# Patient Record
Sex: FEMALE | Race: WHITE | URBAN - METROPOLITAN AREA
[De-identification: names, ages, dates, MRNs, and addresses within clinical notes are randomized per-mention and may not be internally consistent; named-entity substitution may affect disease eponyms.]

---

## 2021-05-14 ENCOUNTER — OFFICE VISIT (OUTPATIENT)
Dept: URBAN - METROPOLITAN AREA CLINIC 91 | Facility: CLINIC | Age: 86
End: 2021-05-14

## 2021-05-14 DIAGNOSIS — H04.123 DRY EYE SYNDROME OF BILATERAL LACRIMAL GLANDS: ICD-10-CM

## 2021-05-14 DIAGNOSIS — H02.831 DERMATOCHALASIS OF RIGHT UPPER EYELID: ICD-10-CM

## 2021-05-14 DIAGNOSIS — H35.3132 BILATERAL NONEXUDATIVE AGE-RELATED MACULAR DEGENERATION, INTERMEDIATE DRY STAGE: Primary | ICD-10-CM

## 2021-05-14 DIAGNOSIS — H25.093 OTHER AGE-RELATED INCIPIENT CATARACT, BILATERAL: ICD-10-CM

## 2021-05-14 DIAGNOSIS — H02.834 DERMATOCHALASIS OF LEFT UPPER EYELID: ICD-10-CM

## 2021-05-14 PROCEDURE — 99213 OFFICE O/P EST LOW 20 MIN: CPT | Performed by: OPHTHALMOLOGY

## 2021-05-14 ASSESSMENT — INTRAOCULAR PRESSURE
OD: 13
OS: 13

## 2021-05-14 ASSESSMENT — VISUAL ACUITY: OS: 20/40

## 2021-05-14 NOTE — IMPRESSION/PLAN
Impression: Other age-related incipient cataract, bilateral: H25.093. Plan: Discussed posterior capsular opacification. Due to the fact that PCO is advanced and affecting the patient's vision and quality of life, YAG capsulotomy will be scheduled. All risks, benefits and alternatives were explained in detail including: possibility of retinal detachment, dislocated IOL, loss of vision or eye and possible need for further surgery.

## 2021-05-14 NOTE — IMPRESSION/PLAN
Impression: Bilateral nonexudative age-related macular degeneration, intermediate dry stage: H35.3132. Plan: For macular degeneration, I recommended using an Amsler grid daily to check vision, and to call office immediately if change is noted. AREDS vitamins were also recommended. Importance of compliance and regular follow-up appointments was emphasized. For follow-up and possible progression of macular degeneration, I have recommended an OCTm to be performed.

## 2021-05-21 ENCOUNTER — PROCEDURE (OUTPATIENT)
Dept: URBAN - METROPOLITAN AREA CLINIC 91 | Facility: CLINIC | Age: 86
End: 2021-05-21

## 2021-05-21 DIAGNOSIS — H26.492 OTHER SECONDARY CATARACT, LEFT EYE: Primary | ICD-10-CM

## 2021-05-21 PROCEDURE — 66821 AFTER CATARACT LASER SURGERY: CPT | Performed by: OPHTHALMOLOGY

## 2021-06-11 ENCOUNTER — POST-OPERATIVE VISIT (OUTPATIENT)
Dept: URBAN - METROPOLITAN AREA CLINIC 91 | Facility: CLINIC | Age: 86
End: 2021-06-11

## 2021-06-11 DIAGNOSIS — H52.4 PRESBYOPIA: ICD-10-CM

## 2021-06-11 DIAGNOSIS — Z48.810 ENCOUNTER FOR SURGICAL AFTERCARE FOLLOWING SURGERY ON A SENSE ORGAN: Primary | ICD-10-CM

## 2021-06-11 PROCEDURE — 99024 POSTOP FOLLOW-UP VISIT: CPT | Performed by: OPHTHALMOLOGY

## 2021-06-11 PROCEDURE — 92015 DETERMINE REFRACTIVE STATE: CPT | Performed by: OPHTHALMOLOGY

## 2021-06-11 ASSESSMENT — VISUAL ACUITY
OS: 20/40
OD: CF 3FT

## 2021-06-11 ASSESSMENT — INTRAOCULAR PRESSURE
OD: 14
OS: 13

## 2021-06-11 NOTE — IMPRESSION/PLAN
Impression:  Encounter for surgical aftercare following surgery on a sense organ  Z48.810. Excellent post op course   Post operative instructions reviewed - Condition is improving - Plan: Patient healing well post YAG OS, MRX reviewed and dispensed. Discussed that due to macular degeneration there may be difficulty but they will still help improve her vision.

## 2021-10-29 ENCOUNTER — OFFICE VISIT (OUTPATIENT)
Dept: URBAN - METROPOLITAN AREA CLINIC 91 | Facility: CLINIC | Age: 86
End: 2021-10-29

## 2021-10-29 DIAGNOSIS — H26.491 OTHER SECONDARY CATARACT, RIGHT EYE: ICD-10-CM

## 2021-10-29 DIAGNOSIS — H35.033 HYPERTENSIVE RETINOPATHY, BILATERAL: ICD-10-CM

## 2021-10-29 PROCEDURE — 99214 OFFICE O/P EST MOD 30 MIN: CPT | Performed by: OPHTHALMOLOGY

## 2021-10-29 ASSESSMENT — INTRAOCULAR PRESSURE
OD: 14
OS: 14

## 2021-10-29 ASSESSMENT — VISUAL ACUITY
OS: 20/80
OD: CF 2FT

## 2021-10-29 NOTE — IMPRESSION/PLAN
Impression: Dry eye syndrome of bilateral lacrimal glands: H04.123. Plan: Discussed posterior capsular opacification. Explained PCO can be observed without intervention if it is not visually significant. Will consider YAG laser capsulotomy in the future if impairment of vision rises to a level that does not meet the patient's functional needs or interferes with their daily activities.

## 2021-10-29 NOTE — IMPRESSION/PLAN
Impression: Nonexudative age-related macular degeneration, bilateral, intermediate dry stage: H35.3132. Plan: OCTm reviewed and explained OU DRY AMD, and explained no correction in vision with a refraction. Encouraged possible low vision if patient is seen by an optometrist who may accommodate. Advised  to give Dr. Earlene Portillo information so that patient may schedule an appointment, For macular degeneration OU. AREDS vitamins were also recommended. Importance of compliance and regular follow-up appointments was emphasized. For follow-up and possible progression of macular degeneration, I have recommended an OCTm to be performed.